# Patient Record
Sex: MALE | Race: WHITE | NOT HISPANIC OR LATINO | URBAN - METROPOLITAN AREA
[De-identification: names, ages, dates, MRNs, and addresses within clinical notes are randomized per-mention and may not be internally consistent; named-entity substitution may affect disease eponyms.]

---

## 2019-03-26 ENCOUNTER — OFFICE (OUTPATIENT)
Dept: URBAN - METROPOLITAN AREA CLINIC 11 | Facility: CLINIC | Age: 34
End: 2019-03-26

## 2019-03-26 VITALS
DIASTOLIC BLOOD PRESSURE: 84 MMHG | HEART RATE: 68 BPM | WEIGHT: 238 LBS | HEIGHT: 74 IN | SYSTOLIC BLOOD PRESSURE: 139 MMHG

## 2019-03-26 DIAGNOSIS — R94.5 ABNORMAL RESULTS OF LIVER FUNCTION STUDIES: ICD-10-CM

## 2019-03-26 LAB
FE+TIBC+FER: FERRITIN, SERUM: 181 NG/ML (ref 30–400)
FE+TIBC+FER: IRON BIND.CAP.(TIBC): 325 UG/DL (ref 250–450)
FE+TIBC+FER: IRON SATURATION: 19 % (ref 15–55)
FE+TIBC+FER: IRON: 62 UG/DL (ref 38–169)
FE+TIBC+FER: UIBC: 263 UG/DL (ref 111–343)
HERED.HEMOCHROMATOSIS, DNA: HEREDITARY  HEMOCHROMATOSIS: (no result)

## 2019-03-26 PROCEDURE — 99244 OFF/OP CNSLTJ NEW/EST MOD 40: CPT | Performed by: INTERNAL MEDICINE

## 2019-03-26 NOTE — SERVICEHPINOTES
Deangelo Najera   is a   33   year old  male   here today at the request of Dr. Archer for evaluation of abnormal liver enzymes. In January, the patient was noted to have an ALT of 57 with a repeat level of 63.  The other liver enzymes were normal and his viral hepatitis studies were negative.  He is not aware of any prior history of elevated liver enzymes.  He did have back surgery in 2015 and notes taking a lot of NSAIDs prior to the surgery but not recently.  He also used to heavily drink alcohol until about a year ago and now he only has about fiber 6 beers per week.  He does have a previous history of binge alcohol drinking.  He denies any blood transfusions, known family history of liver disease, IV or intranasal drug use.  He does have a tattoo but that was placed [facility] a license parlor.  He denies any symptoms of chronic liver disease.

## 2019-04-10 ENCOUNTER — OFFICE (OUTPATIENT)
Dept: URBAN - METROPOLITAN AREA CLINIC 19 | Facility: CLINIC | Age: 34
End: 2019-04-10

## 2019-04-10 DIAGNOSIS — R94.5 ABNORMAL RESULTS OF LIVER FUNCTION STUDIES: ICD-10-CM

## 2019-04-10 PROCEDURE — 76700 US EXAM ABDOM COMPLETE: CPT | Performed by: NURSE PRACTITIONER

## 2019-08-14 ENCOUNTER — OFFICE (OUTPATIENT)
Dept: URBAN - METROPOLITAN AREA CLINIC 11 | Facility: CLINIC | Age: 34
End: 2019-08-14

## 2019-11-04 ENCOUNTER — OFFICE (OUTPATIENT)
Dept: URBAN - METROPOLITAN AREA CLINIC 14 | Facility: CLINIC | Age: 34
End: 2019-11-04

## 2019-11-04 VITALS
WEIGHT: 236 LBS | DIASTOLIC BLOOD PRESSURE: 79 MMHG | HEART RATE: 80 BPM | HEIGHT: 74 IN | SYSTOLIC BLOOD PRESSURE: 132 MMHG

## 2019-11-04 DIAGNOSIS — R94.5 ABNORMAL RESULTS OF LIVER FUNCTION STUDIES: ICD-10-CM

## 2019-11-04 PROCEDURE — 99213 OFFICE O/P EST LOW 20 MIN: CPT | Performed by: INTERNAL MEDICINE

## 2019-11-27 ENCOUNTER — OFFICE (OUTPATIENT)
Dept: URBAN - METROPOLITAN AREA CLINIC 14 | Facility: CLINIC | Age: 34
End: 2019-11-27

## 2019-11-27 VITALS — HEIGHT: 74 IN

## 2019-11-27 DIAGNOSIS — R94.5 ABNORMAL RESULTS OF LIVER FUNCTION STUDIES: ICD-10-CM

## 2019-11-27 PROCEDURE — 91200 LIVER ELASTOGRAPHY: CPT | Performed by: INTERNAL MEDICINE
